# Patient Record
Sex: FEMALE | Race: WHITE | URBAN - METROPOLITAN AREA
[De-identification: names, ages, dates, MRNs, and addresses within clinical notes are randomized per-mention and may not be internally consistent; named-entity substitution may affect disease eponyms.]

---

## 2024-01-27 ENCOUNTER — OFFICE VISIT (OUTPATIENT)
Age: 34
End: 2024-01-27

## 2024-01-27 VITALS
TEMPERATURE: 97.7 F | DIASTOLIC BLOOD PRESSURE: 89 MMHG | BODY MASS INDEX: 23.8 KG/M2 | HEART RATE: 59 BPM | OXYGEN SATURATION: 98 % | HEIGHT: 71 IN | WEIGHT: 170 LBS | SYSTOLIC BLOOD PRESSURE: 133 MMHG

## 2024-01-27 DIAGNOSIS — J02.9 SORE THROAT: Primary | ICD-10-CM

## 2024-01-27 DIAGNOSIS — J02.0 STREPTOCOCCAL PHARYNGITIS: ICD-10-CM

## 2024-01-27 LAB — S PYO AG THROAT QL: POSITIVE

## 2024-01-27 RX ORDER — AMOXICILLIN 500 MG/1
500 CAPSULE ORAL 2 TIMES DAILY
Qty: 20 CAPSULE | Refills: 0 | Status: SHIPPED | OUTPATIENT
Start: 2024-01-27 | End: 2024-02-06

## 2024-01-27 NOTE — PROGRESS NOTES
Latasha Solis (:  1990) is a 33 y.o. female,New patient, here for evaluation of the following chief complaint(s):  Pharyngitis (Sore throat, body aches, fatigue, symptoms x 3 days, worse yesterday. Took ibuprofen and tylenol, no fever.)      ASSESSMENT/PLAN:  1. Sore throat  - POCT rapid strep A POSITIVE    2. Streptococcal pharyngitis  - amoxicillin (AMOXIL) 500 MG capsule; Take 1 capsule by mouth 2 times daily for 10 days  Dispense: 20 capsule; Refill: 0  -STREP TEST POSITIVE   -STAY HOME FOR 2 DAYS,KEEP WELL HYDRATION  -USE IBUPROFEN FOR THROAT HURT  Return if symptoms worsen or fail to improve.    SUBJECTIVE/OBJECTIVE:  PRESENT TO CLINIC WITH THROAT HURT.,BODY ACHES AND FATIGUE FOR 3 DAYS.NO SICK CONTACT AT HOME. HAVE KIDS AND WORRY ABOUT STREP INFECTION.      History provided by:  Patient  Pharyngitis  Associated symptoms: sore throat        Vitals:    24 [id number]   BP: 133/89   Pulse: 59   Temp: 97.7 °F (36.5 °C)   TempSrc: Oral   SpO2: 98%   Weight: 77.1 kg (170 lb)   Height: 1.803 m (5' 11\")       Review of Systems   HENT:  Positive for sore throat.        Physical Exam  Constitutional:       Appearance: Normal appearance.   HENT:      Head: Normocephalic and atraumatic.      Nose: Nose normal.      Mouth/Throat:      Pharynx: Posterior oropharyngeal erythema present.   Eyes:      Pupils: Pupils are equal, round, and reactive to light.   Pulmonary:      Effort: Pulmonary effort is normal.   Musculoskeletal:         General: Normal range of motion.      Cervical back: Normal range of motion and neck supple.   Neurological:      Mental Status: She is alert and oriented to person, place, and time.   Psychiatric:         Mood and Affect: Mood normal.           An electronic signature was used to authenticate this note.    --Kusum Garrison DO